# Patient Record
Sex: FEMALE | Race: WHITE | Employment: FULL TIME | URBAN - METROPOLITAN AREA
[De-identification: names, ages, dates, MRNs, and addresses within clinical notes are randomized per-mention and may not be internally consistent; named-entity substitution may affect disease eponyms.]

---

## 2018-11-16 ENCOUNTER — HOSPITAL ENCOUNTER (EMERGENCY)
Age: 33
Discharge: HOME | End: 2018-11-17
Attending: EMERGENCY MEDICINE
Payer: COMMERCIAL

## 2018-11-16 ENCOUNTER — APPOINTMENT (OUTPATIENT)
Dept: GENERAL RADIOLOGY | Age: 33
End: 2018-11-16
Payer: COMMERCIAL

## 2018-11-16 DIAGNOSIS — J18.9 PNEUMONIA DUE TO ORGANISM: Primary | ICD-10-CM

## 2018-11-16 LAB
A/G RATIO: 1.3 (ref 1.1–2.2)
ALBUMIN SERPL-MCNC: 4 G/DL (ref 3.4–5)
ALP BLD-CCNC: 69 U/L (ref 40–129)
ALT SERPL-CCNC: 19 U/L (ref 10–40)
ANION GAP SERPL CALCULATED.3IONS-SCNC: 12 MMOL/L (ref 3–16)
AST SERPL-CCNC: 16 U/L (ref 15–37)
BASOPHILS ABSOLUTE: 0 K/UL (ref 0–0.2)
BASOPHILS RELATIVE PERCENT: 0.5 %
BILIRUB SERPL-MCNC: <0.2 MG/DL (ref 0–1)
BUN BLDV-MCNC: 9 MG/DL (ref 7–20)
CALCIUM SERPL-MCNC: 9 MG/DL (ref 8.3–10.6)
CHLORIDE BLD-SCNC: 98 MMOL/L (ref 99–110)
CO2: 23 MMOL/L (ref 21–32)
CREAT SERPL-MCNC: 0.6 MG/DL (ref 0.6–1.1)
EOSINOPHILS ABSOLUTE: 0 K/UL (ref 0–0.6)
EOSINOPHILS RELATIVE PERCENT: 0.3 %
GFR AFRICAN AMERICAN: >60
GFR NON-AFRICAN AMERICAN: >60
GLOBULIN: 3 G/DL
GLUCOSE BLD-MCNC: 127 MG/DL (ref 70–99)
HCT VFR BLD CALC: 38 % (ref 36–48)
HEMOGLOBIN: 12.8 G/DL (ref 12–16)
LACTIC ACID: 1.6 MMOL/L (ref 0.4–2)
LYMPHOCYTES ABSOLUTE: 1.2 K/UL (ref 1–5.1)
LYMPHOCYTES RELATIVE PERCENT: 12.4 %
MCH RBC QN AUTO: 29.6 PG (ref 26–34)
MCHC RBC AUTO-ENTMCNC: 33.7 G/DL (ref 31–36)
MCV RBC AUTO: 87.9 FL (ref 80–100)
MONOCYTES ABSOLUTE: 0.4 K/UL (ref 0–1.3)
MONOCYTES RELATIVE PERCENT: 4.6 %
NEUTROPHILS ABSOLUTE: 7.7 K/UL (ref 1.7–7.7)
NEUTROPHILS RELATIVE PERCENT: 82.2 %
PDW BLD-RTO: 13.6 % (ref 12.4–15.4)
PLATELET # BLD: 271 K/UL (ref 135–450)
PMV BLD AUTO: 6.8 FL (ref 5–10.5)
POTASSIUM SERPL-SCNC: 3.6 MMOL/L (ref 3.5–5.1)
RAPID INFLUENZA  B AGN: NEGATIVE
RAPID INFLUENZA A AGN: NEGATIVE
RBC # BLD: 4.32 M/UL (ref 4–5.2)
SODIUM BLD-SCNC: 133 MMOL/L (ref 136–145)
TOTAL PROTEIN: 7 G/DL (ref 6.4–8.2)
WBC # BLD: 9.3 K/UL (ref 4–11)

## 2018-11-16 PROCEDURE — 85025 COMPLETE CBC W/AUTO DIFF WBC: CPT

## 2018-11-16 PROCEDURE — 6370000000 HC RX 637 (ALT 250 FOR IP): Performed by: PHYSICIAN ASSISTANT

## 2018-11-16 PROCEDURE — 87040 BLOOD CULTURE FOR BACTERIA: CPT

## 2018-11-16 PROCEDURE — 83605 ASSAY OF LACTIC ACID: CPT

## 2018-11-16 PROCEDURE — 80053 COMPREHEN METABOLIC PANEL: CPT

## 2018-11-16 PROCEDURE — 99285 EMERGENCY DEPT VISIT HI MDM: CPT

## 2018-11-16 PROCEDURE — 71046 X-RAY EXAM CHEST 2 VIEWS: CPT

## 2018-11-16 PROCEDURE — 94664 DEMO&/EVAL PT USE INHALER: CPT

## 2018-11-16 PROCEDURE — 96366 THER/PROPH/DIAG IV INF ADDON: CPT

## 2018-11-16 PROCEDURE — 2500000003 HC RX 250 WO HCPCS: Performed by: PHYSICIAN ASSISTANT

## 2018-11-16 PROCEDURE — 2580000003 HC RX 258: Performed by: PHYSICIAN ASSISTANT

## 2018-11-16 PROCEDURE — 96365 THER/PROPH/DIAG IV INF INIT: CPT

## 2018-11-16 PROCEDURE — 94640 AIRWAY INHALATION TREATMENT: CPT

## 2018-11-16 PROCEDURE — 87804 INFLUENZA ASSAY W/OPTIC: CPT

## 2018-11-16 RX ORDER — IPRATROPIUM BROMIDE AND ALBUTEROL SULFATE 2.5; .5 MG/3ML; MG/3ML
2 SOLUTION RESPIRATORY (INHALATION) ONCE
Status: COMPLETED | OUTPATIENT
Start: 2018-11-16 | End: 2018-11-16

## 2018-11-16 RX ORDER — PREDNISONE 20 MG/1
60 TABLET ORAL ONCE
Status: COMPLETED | OUTPATIENT
Start: 2018-11-16 | End: 2018-11-16

## 2018-11-16 RX ORDER — 0.9 % SODIUM CHLORIDE 0.9 %
1000 INTRAVENOUS SOLUTION INTRAVENOUS ONCE
Status: COMPLETED | OUTPATIENT
Start: 2018-11-16 | End: 2018-11-17

## 2018-11-16 RX ORDER — ACETAMINOPHEN 500 MG
1000 TABLET ORAL ONCE
Status: COMPLETED | OUTPATIENT
Start: 2018-11-16 | End: 2018-11-16

## 2018-11-16 RX ORDER — IPRATROPIUM BROMIDE AND ALBUTEROL SULFATE 2.5; .5 MG/3ML; MG/3ML
3 SOLUTION RESPIRATORY (INHALATION) ONCE
Status: DISCONTINUED | OUTPATIENT
Start: 2018-11-16 | End: 2018-11-16

## 2018-11-16 RX ORDER — IPRATROPIUM BROMIDE AND ALBUTEROL SULFATE 2.5; .5 MG/3ML; MG/3ML
1 SOLUTION RESPIRATORY (INHALATION) ONCE
Status: COMPLETED | OUTPATIENT
Start: 2018-11-16 | End: 2018-11-16

## 2018-11-16 RX ORDER — IBUPROFEN 400 MG/1
800 TABLET ORAL ONCE
Status: COMPLETED | OUTPATIENT
Start: 2018-11-16 | End: 2018-11-16

## 2018-11-16 RX ADMIN — PREDNISONE 60 MG: 20 TABLET ORAL at 21:16

## 2018-11-16 RX ADMIN — ACETAMINOPHEN 1000 MG: 500 TABLET ORAL at 21:16

## 2018-11-16 RX ADMIN — IPRATROPIUM BROMIDE AND ALBUTEROL SULFATE 1 AMPULE: .5; 3 SOLUTION RESPIRATORY (INHALATION) at 20:18

## 2018-11-16 RX ADMIN — SODIUM CHLORIDE 1000 ML: 9 INJECTION, SOLUTION INTRAVENOUS at 22:42

## 2018-11-16 RX ADMIN — IPRATROPIUM BROMIDE AND ALBUTEROL SULFATE 2 AMPULE: .5; 3 SOLUTION RESPIRATORY (INHALATION) at 22:39

## 2018-11-16 RX ADMIN — DOXYCYCLINE 100 MG: 100 INJECTION, POWDER, LYOPHILIZED, FOR SOLUTION INTRAVENOUS at 22:42

## 2018-11-16 RX ADMIN — IBUPROFEN 800 MG: 400 TABLET, FILM COATED ORAL at 21:16

## 2018-11-16 ASSESSMENT — ENCOUNTER SYMPTOMS
COUGH: 1
VOMITING: 0
RHINORRHEA: 0
NAUSEA: 0
SHORTNESS OF BREATH: 1
WHEEZING: 1
DIARRHEA: 0

## 2018-11-16 ASSESSMENT — PAIN SCALES - GENERAL
PAINLEVEL_OUTOF10: 2
PAINLEVEL_OUTOF10: 4
PAINLEVEL_OUTOF10: 6
PAINLEVEL_OUTOF10: 6

## 2018-11-16 ASSESSMENT — PAIN DESCRIPTION - PAIN TYPE: TYPE: ACUTE PAIN

## 2018-11-16 ASSESSMENT — PAIN DESCRIPTION - LOCATION: LOCATION: GENERALIZED

## 2018-11-16 ASSESSMENT — PAIN DESCRIPTION - DESCRIPTORS: DESCRIPTORS: ACHING

## 2018-11-17 VITALS
RESPIRATION RATE: 18 BRPM | TEMPERATURE: 100.2 F | DIASTOLIC BLOOD PRESSURE: 77 MMHG | OXYGEN SATURATION: 95 % | HEIGHT: 65 IN | HEART RATE: 110 BPM | SYSTOLIC BLOOD PRESSURE: 154 MMHG

## 2018-11-17 RX ORDER — ALBUTEROL SULFATE 90 UG/1
2 AEROSOL, METERED RESPIRATORY (INHALATION) EVERY 4 HOURS PRN
Qty: 1 INHALER | Refills: 1 | Status: ON HOLD | OUTPATIENT
Start: 2018-11-17 | End: 2020-02-11

## 2018-11-17 RX ORDER — METHYLPREDNISOLONE 4 MG/1
TABLET ORAL
Qty: 1 KIT | Refills: 0 | Status: ON HOLD | OUTPATIENT
Start: 2018-11-17 | End: 2022-01-14 | Stop reason: HOSPADM

## 2018-11-17 RX ORDER — BROMPHENIRAMINE MALEATE, PSEUDOEPHEDRINE HYDROCHLORIDE, AND DEXTROMETHORPHAN HYDROBROMIDE 2; 30; 10 MG/5ML; MG/5ML; MG/5ML
5 SYRUP ORAL 4 TIMES DAILY PRN
Qty: 150 ML | Refills: 0 | Status: ON HOLD | OUTPATIENT
Start: 2018-11-17 | End: 2022-01-13

## 2018-11-17 RX ORDER — DOXYCYCLINE 100 MG/1
100 TABLET ORAL 2 TIMES DAILY
Qty: 20 TABLET | Refills: 0 | Status: SHIPPED | OUTPATIENT
Start: 2018-11-17 | End: 2018-11-27

## 2018-11-17 NOTE — ED PROVIDER NOTES
history. No family status information on file. SOCIAL HISTORY      reports that she has never smoked. She does not have any smokeless tobacco history on file. She reports that she drinks about 0.6 - 1.2 oz of alcohol per week . She reports that she does not use drugs. PHYSICAL EXAM    (up to 7 for level 4, 8 or more for level 5)     ED Triage Vitals   BP Temp Temp src Pulse Resp SpO2 Height Weight   -- -- -- -- -- -- -- --       Physical Exam   Constitutional: She is oriented to person, place, and time. She appears well-developed and well-nourished. No distress. HENT:   Head: Normocephalic and atraumatic. Right Ear: External ear normal.   Left Ear: External ear normal.   Nose: Nose normal.   Mouth/Throat: No oropharyngeal exudate. Eyes: EOM are normal.   Neck: Normal range of motion. Neck supple. Cardiovascular: Regular rhythm and normal heart sounds. +Tachy   Pulmonary/Chest: Effort normal and breath sounds normal. No respiratory distress. She has no wheezes. Musculoskeletal: Normal range of motion. Neurological: She is alert and oriented to person, place, and time. Skin: Skin is warm and dry. She is not diaphoretic. Psychiatric: She has a normal mood and affect. Her behavior is normal. Judgment and thought content normal.       DIFFERENTIAL DIAGNOSIS   Pnuemonia, flu, URI, sepsis    DIAGNOSTICRESULTS         RADIOLOGY:   Non-plain film images such as CT, Ultrasound and MRI are read by the radiologist. Plain radiographic images are visualized and preliminarily interpreted by MELODY Bowling with the below findings:      Interpretation per the Radiologist below, if available at the time of this note:    XR CHEST STANDARD (2 VW)   Final Result   Confluent left lower lobe pneumonia. Follow-up to complete clearing is   recommended.                LABS:  Results for orders placed or performed during the hospital encounter of 11/16/18   Rapid influenza A/B antigens   Result Value

## 2018-11-22 LAB
BLOOD CULTURE, ROUTINE: NORMAL
CULTURE, BLOOD 2: NORMAL

## 2019-08-02 LAB
ABO, EXTERNAL RESULT: NORMAL
HEP B, EXTERNAL RESULT: NEGATIVE
HIV, EXTERNAL RESULT: NORMAL
RH FACTOR, EXTERNAL RESULT: POSITIVE
RPR, EXTERNAL RESULT: NORMAL
RUBELLA TITER, EXTERNAL RESULT: NORMAL

## 2020-01-16 LAB — GBS, EXTERNAL RESULT: NEGATIVE

## 2020-02-11 ENCOUNTER — HOSPITAL ENCOUNTER (INPATIENT)
Age: 35
LOS: 1 days | Discharge: HOME | End: 2020-02-12
Attending: OBSTETRICS & GYNECOLOGY | Admitting: OBSTETRICS & GYNECOLOGY
Payer: COMMERCIAL

## 2020-02-11 PROBLEM — Z34.00 SUPERVISION OF NORMAL FIRST PREGNANCY: Status: ACTIVE | Noted: 2020-02-11

## 2020-02-11 LAB
AMPHETAMINE SCREEN, URINE: NORMAL
BARBITURATE SCREEN URINE: NORMAL
BASOPHILS ABSOLUTE: 0 K/UL (ref 0–0.2)
BASOPHILS RELATIVE PERCENT: 0.4 %
BENZODIAZEPINE SCREEN, URINE: NORMAL
BUPRENORPHINE URINE: NORMAL
CANNABINOID SCREEN URINE: NORMAL
COCAINE METABOLITE SCREEN URINE: NORMAL
EOSINOPHILS ABSOLUTE: 0.1 K/UL (ref 0–0.6)
EOSINOPHILS RELATIVE PERCENT: 0.4 %
HCT VFR BLD CALC: 36.8 % (ref 36–48)
HEMOGLOBIN: 12.4 G/DL (ref 12–16)
LYMPHOCYTES ABSOLUTE: 2.1 K/UL (ref 1–5.1)
LYMPHOCYTES RELATIVE PERCENT: 15.9 %
Lab: NORMAL
MCH RBC QN AUTO: 30.1 PG (ref 26–34)
MCHC RBC AUTO-ENTMCNC: 33.7 G/DL (ref 31–36)
MCV RBC AUTO: 89.4 FL (ref 80–100)
METHADONE SCREEN, URINE: NORMAL
MONOCYTES ABSOLUTE: 0.6 K/UL (ref 0–1.3)
MONOCYTES RELATIVE PERCENT: 4.3 %
NEUTROPHILS ABSOLUTE: 10.6 K/UL (ref 1.7–7.7)
NEUTROPHILS RELATIVE PERCENT: 79 %
OPIATE SCREEN URINE: NORMAL
OXYCODONE URINE: NORMAL
PDW BLD-RTO: 14.8 % (ref 12.4–15.4)
PH UA: 5
PHENCYCLIDINE SCREEN URINE: NORMAL
PLATELET # BLD: 294 K/UL (ref 135–450)
PMV BLD AUTO: 7.3 FL (ref 5–10.5)
PROPOXYPHENE SCREEN: NORMAL
RBC # BLD: 4.12 M/UL (ref 4–5.2)
SPECIMEN STATUS: NORMAL
TOTAL SYPHILLIS IGG/IGM: NORMAL
WBC # BLD: 13.4 K/UL (ref 4–11)

## 2020-02-11 PROCEDURE — 6360000002 HC RX W HCPCS: Performed by: OBSTETRICS & GYNECOLOGY

## 2020-02-11 PROCEDURE — 7200000001 HC VAGINAL DELIVERY

## 2020-02-11 PROCEDURE — 0UQMXZZ REPAIR VULVA, EXTERNAL APPROACH: ICD-10-PCS | Performed by: OBSTETRICS & GYNECOLOGY

## 2020-02-11 PROCEDURE — 6370000000 HC RX 637 (ALT 250 FOR IP): Performed by: OBSTETRICS & GYNECOLOGY

## 2020-02-11 PROCEDURE — 6370000000 HC RX 637 (ALT 250 FOR IP)

## 2020-02-11 PROCEDURE — 86780 TREPONEMA PALLIDUM: CPT

## 2020-02-11 PROCEDURE — 2580000003 HC RX 258: Performed by: OBSTETRICS & GYNECOLOGY

## 2020-02-11 PROCEDURE — 1220000000 HC SEMI PRIVATE OB R&B

## 2020-02-11 PROCEDURE — 2500000003 HC RX 250 WO HCPCS: Performed by: OBSTETRICS & GYNECOLOGY

## 2020-02-11 PROCEDURE — 85025 COMPLETE CBC W/AUTO DIFF WBC: CPT

## 2020-02-11 PROCEDURE — 6360000002 HC RX W HCPCS

## 2020-02-11 PROCEDURE — 80307 DRUG TEST PRSMV CHEM ANLYZR: CPT

## 2020-02-11 PROCEDURE — 0KQM0ZZ REPAIR PERINEUM MUSCLE, OPEN APPROACH: ICD-10-PCS | Performed by: OBSTETRICS & GYNECOLOGY

## 2020-02-11 RX ORDER — LIDOCAINE HYDROCHLORIDE 10 MG/ML
INJECTION, SOLUTION EPIDURAL; INFILTRATION; INTRACAUDAL; PERINEURAL
Status: DISCONTINUED
Start: 2020-02-11 | End: 2020-02-11

## 2020-02-11 RX ORDER — SODIUM CHLORIDE 0.9 % (FLUSH) 0.9 %
10 SYRINGE (ML) INJECTION EVERY 12 HOURS SCHEDULED
Status: DISCONTINUED | OUTPATIENT
Start: 2020-02-11 | End: 2020-02-11

## 2020-02-11 RX ORDER — ACETAMINOPHEN 325 MG/1
650 TABLET ORAL EVERY 4 HOURS PRN
Status: DISCONTINUED | OUTPATIENT
Start: 2020-02-11 | End: 2020-02-12 | Stop reason: HOSPADM

## 2020-02-11 RX ORDER — SODIUM CHLORIDE, SODIUM LACTATE, POTASSIUM CHLORIDE, CALCIUM CHLORIDE 600; 310; 30; 20 MG/100ML; MG/100ML; MG/100ML; MG/100ML
INJECTION, SOLUTION INTRAVENOUS CONTINUOUS
Status: DISCONTINUED | OUTPATIENT
Start: 2020-02-11 | End: 2020-02-11

## 2020-02-11 RX ORDER — ACETAMINOPHEN 325 MG/1
650 TABLET ORAL EVERY 4 HOURS PRN
Status: DISCONTINUED | OUTPATIENT
Start: 2020-02-11 | End: 2020-02-11

## 2020-02-11 RX ORDER — NIFEDIPINE 10 MG/1
10 CAPSULE ORAL EVERY 8 HOURS SCHEDULED
Status: DISCONTINUED | OUTPATIENT
Start: 2020-02-11 | End: 2020-02-12 | Stop reason: HOSPADM

## 2020-02-11 RX ORDER — ONDANSETRON 2 MG/ML
4 INJECTION INTRAMUSCULAR; INTRAVENOUS EVERY 6 HOURS PRN
Status: DISCONTINUED | OUTPATIENT
Start: 2020-02-11 | End: 2020-02-11

## 2020-02-11 RX ORDER — METHYLERGONOVINE MALEATE 0.2 MG/ML
200 INJECTION INTRAVENOUS PRN
Status: DISCONTINUED | OUTPATIENT
Start: 2020-02-11 | End: 2020-02-11

## 2020-02-11 RX ORDER — SODIUM CHLORIDE, SODIUM LACTATE, POTASSIUM CHLORIDE, CALCIUM CHLORIDE 600; 310; 30; 20 MG/100ML; MG/100ML; MG/100ML; MG/100ML
INJECTION, SOLUTION INTRAVENOUS CONTINUOUS
Status: DISCONTINUED | OUTPATIENT
Start: 2020-02-11 | End: 2020-02-12 | Stop reason: HOSPADM

## 2020-02-11 RX ORDER — LANOLIN 100 %
OINTMENT (GRAM) TOPICAL PRN
Status: DISCONTINUED | OUTPATIENT
Start: 2020-02-11 | End: 2020-02-12 | Stop reason: HOSPADM

## 2020-02-11 RX ORDER — SODIUM CHLORIDE 0.9 % (FLUSH) 0.9 %
10 SYRINGE (ML) INJECTION PRN
Status: DISCONTINUED | OUTPATIENT
Start: 2020-02-11 | End: 2020-02-12 | Stop reason: HOSPADM

## 2020-02-11 RX ORDER — DOCUSATE SODIUM 100 MG/1
100 CAPSULE, LIQUID FILLED ORAL 2 TIMES DAILY
Status: DISCONTINUED | OUTPATIENT
Start: 2020-02-11 | End: 2020-02-11

## 2020-02-11 RX ORDER — METHYLERGONOVINE MALEATE 0.2 MG/ML
200 INJECTION INTRAVENOUS PRN
Status: DISCONTINUED | OUTPATIENT
Start: 2020-02-11 | End: 2020-02-12 | Stop reason: HOSPADM

## 2020-02-11 RX ORDER — DOCUSATE SODIUM 100 MG/1
100 CAPSULE, LIQUID FILLED ORAL 2 TIMES DAILY
Status: DISCONTINUED | OUTPATIENT
Start: 2020-02-11 | End: 2020-02-12 | Stop reason: HOSPADM

## 2020-02-11 RX ORDER — IBUPROFEN 600 MG/1
600 TABLET ORAL EVERY 8 HOURS PRN
Status: DISCONTINUED | OUTPATIENT
Start: 2020-02-11 | End: 2020-02-12 | Stop reason: HOSPADM

## 2020-02-11 RX ORDER — MISOPROSTOL 100 UG/1
800 TABLET ORAL PRN
Status: DISCONTINUED | OUTPATIENT
Start: 2020-02-11 | End: 2020-02-11

## 2020-02-11 RX ORDER — LABETALOL HYDROCHLORIDE 5 MG/ML
20 INJECTION, SOLUTION INTRAVENOUS ONCE
Status: COMPLETED | OUTPATIENT
Start: 2020-02-11 | End: 2020-02-11

## 2020-02-11 RX ORDER — ERYTHROMYCIN 5 MG/G
OINTMENT OPHTHALMIC
Status: DISCONTINUED
Start: 2020-02-11 | End: 2020-02-11

## 2020-02-11 RX ORDER — SODIUM CHLORIDE 0.9 % (FLUSH) 0.9 %
10 SYRINGE (ML) INJECTION EVERY 12 HOURS SCHEDULED
Status: DISCONTINUED | OUTPATIENT
Start: 2020-02-11 | End: 2020-02-12 | Stop reason: HOSPADM

## 2020-02-11 RX ORDER — SODIUM CHLORIDE 0.9 % (FLUSH) 0.9 %
10 SYRINGE (ML) INJECTION PRN
Status: DISCONTINUED | OUTPATIENT
Start: 2020-02-11 | End: 2020-02-11

## 2020-02-11 RX ADMIN — Medication 999 MILLI-UNITS/MIN: at 02:42

## 2020-02-11 RX ADMIN — DOCUSATE SODIUM 100 MG: 100 CAPSULE ORAL at 09:42

## 2020-02-11 RX ADMIN — IBUPROFEN 600 MG: 600 TABLET, FILM COATED ORAL at 18:08

## 2020-02-11 RX ADMIN — SODIUM CHLORIDE, POTASSIUM CHLORIDE, SODIUM LACTATE AND CALCIUM CHLORIDE: 600; 310; 30; 20 INJECTION, SOLUTION INTRAVENOUS at 02:05

## 2020-02-11 RX ADMIN — DOCUSATE SODIUM 100 MG: 100 CAPSULE ORAL at 20:14

## 2020-02-11 RX ADMIN — Medication 10 ML: at 09:42

## 2020-02-11 RX ADMIN — BENZOCAINE AND LEVOMENTHOL: 200; 5 SPRAY TOPICAL at 05:02

## 2020-02-11 RX ADMIN — LABETALOL HYDROCHLORIDE 20 MG: 5 INJECTION, SOLUTION INTRAVENOUS at 05:46

## 2020-02-11 RX ADMIN — NIFEDIPINE 10 MG: 10 CAPSULE ORAL at 20:14

## 2020-02-11 RX ADMIN — Medication 200 MILLI-UNITS/MIN: at 03:50

## 2020-02-11 ASSESSMENT — PAIN SCALES - GENERAL: PAINLEVEL_OUTOF10: 1

## 2020-02-11 NOTE — H&P
Obstetrics and Gynecology   Obstetrics History and Physical        CHIEF COMPLAINT:  contractions    HISTORY OF PRESENT ILLNESS:      The patient is a 29 y.o. female at 44w3d. OB History        1    Para        Term                AB        Living   0       SAB        TAB        Ectopic        Molar        Multiple        Live Births                Patient presents with a chief complaint as above and is being admitted for active phase labor. The patient does express a strong desire for natural childbirth and she is consented for this choice at each office visit. Estimated Due Date: Estimated Date of Delivery: 2/10/20    PRENATAL CARE:    Complicated by: none    PAST OB HISTORY:  OB History        1    Para        Term                AB        Living   0       SAB        TAB        Ectopic        Molar        Multiple        Live Births                    Past Medical History:        Diagnosis Date    Asthma     USES INHALER     Past Surgical History:    History reviewed. No pertinent surgical history. Allergies:  Patient has no known allergies. Social History:    Social History     Socioeconomic History    Marital status:      Spouse name: Not on file    Number of children: Not on file    Years of education: Not on file    Highest education level: Not on file   Occupational History    Not on file   Social Needs    Financial resource strain: Not on file    Food insecurity:     Worry: Not on file     Inability: Not on file    Transportation needs:     Medical: Not on file     Non-medical: Not on file   Tobacco Use    Smoking status: Never Smoker    Smokeless tobacco: Never Used   Substance and Sexual Activity    Alcohol use:  Yes     Alcohol/week: 1.0 - 2.0 standard drinks     Types: 1 - 2 Standard drinks or equivalent per week    Drug use: No    Sexual activity: Yes     Partners: Male   Lifestyle    Physical activity:     Days per week: Not on file     Minutes

## 2020-02-11 NOTE — LACTATION NOTE
This note was copied from a baby's chart. Lactation Progress Note      Data:  LC to room per RN request.   MOB stated infant had not fed since 10. Infant was in crib asleep and swaddled. Action:  [facility] asked permission from MOB to undress infant  down to the diaper to help infant wake for feeding. MOB gave [facility] permission to undress infant. Infant was given to MOB and infant started to spit up. MOB attempted to burp infant. Infant was then placed in cradle hold and MOB attempted to feed infant on the right breast.  LC then repositioned infant in football hold. Infant would latch for 2 sucks then would come off. Infant attempt for 10 min on right breast and would not stay awake for feed. MOB hand expressed drops of colostrum into infants mouth. Infant was placed skin to skin with MOB and infant began rooting after 5 min. MOB attempted infant in cradle hold on the left breast and infant could not obtain a latch. LC explained to MOB on how to do cross cradle hold to have more control over infants head during feeding. Infant still could not obtain a latch. LC placed infant in football hold and demonstrated to MOB how to do the corner hold of the nipple to help infant obtain a latch. Infant latched on and fed for 15 min with SRS and several swallow. [facility] burped infant and placed infant skin to skin with MOB. Discussed ways to know if infant is latched and that MOB should not feed pain during feeding. MOB stated she has a [employer] pump for home use. Response:  MOB will call for help with next feeding if experiencing problems latching or if MOB has more questions for LC. No further questions at this time.

## 2020-02-11 NOTE — FLOWSHEET NOTE
[id number]    Pt presented to triage w c/o SROM @ 2100 and contractions. SVE 8/90/0. Pt moved to OB room 10. Dr Cristi Zavaleta notified and en route to hospital.  EFM hand held per this RN, audible movements heard. Difficult to monitor due to maternal habitus and pt unable to sit still due to pain. Pt desires NCB. This RN remained @ bedside until delivery. Dr Cristi Zavaleta @ bedside @ 9911 0053. SVE 10/100/+2.  viable female infant. Apgars 9/9.

## 2020-02-11 NOTE — LACTATION NOTE
This note was copied from a baby's chart. Lactation Consult Note      LC to room per RN request to assist with feeding. R/L nipple is WNL/everted; tissue is very stretchy. Colostrum expressed easily per mom, NB latched easily at L breast in cross cradle hold. MOB used c-shape hold for initial latch; MARIAMA, SRS, and AS. Mother denies any pain with latch. Reviewed plans to know if baby is getting enough at the breast. Will provide Understanding Breastfeeding Information book upon admission to [facility] unit.

## 2020-02-11 NOTE — L&D DELIVERY NOTE
intervention:  reduced  Nuchal cord description:  loose nuchal cord  Cord around:  shoulders  Number of loops:  1  Delayed cord clamping?:  Yes  Cord clamped date/time:  2020 0240  Cord blood disposition:  Refrigerator  Gases sent?:  Yes  Stem cell collection (by provider): No     Placenta    Date/time:  2020 02:42:00  Removal:  Spontaneous  Appearance:  Intact  Disposition:  Refrigerator     Delivery Resuscitation    Method:  Bulb Suction, Stimulation     Apgars    Living status:  Living  Apgars   1 Minute:   5 Minute:   10 Minute 15 Minute 20 Minute   Skin Color: 1  1       Heart Rate: 2  2       Reflex Irritability: 2  2       Muscle Tone: 2  2       Respiratory Effort: 2  2       Total: 9  9               Apgars Assigned By:  Kentrell Wilson RN     Skin to Skin    Skin to skin initiation date/time: 20 02:39:00   Skin to skin with: Mother  Skin to skin end date/time:        [place] Measurements           Delivery Information    Episiotomy:  None  Perineal lacerations:  2nd Repaired:  Yes   Surgical or additional est. blood loss (mL):  0 (View Only):  Edit in Flowsheets   Combined est. blood loss (mL):  0     Vaginal Delivery Counts    Initial count personnel:  FLACA GALEANA OB TECH  Initial count verified by:  FLACA KAYE RN   4x4:   Needles:   Instruments:   Lap Pads:   Sponges:     Initial counts:          Final counts:                Spontaneous vaginal delivery over second laceration with irregular nature as a cicatrix over the posterior fourchette with repair under local anesthesia with 3-0 vicryl in the usual fashion without difficulty. A inner right labial laceration is hemostatic and not repaired. Mom and baby are doing well at this time.

## 2020-02-12 VITALS
SYSTOLIC BLOOD PRESSURE: 143 MMHG | RESPIRATION RATE: 18 BRPM | WEIGHT: 234 LBS | BODY MASS INDEX: 38.94 KG/M2 | TEMPERATURE: 98.4 F | HEART RATE: 99 BPM | DIASTOLIC BLOOD PRESSURE: 92 MMHG

## 2020-02-12 PROBLEM — Z34.00 SUPERVISION OF NORMAL FIRST PREGNANCY: Status: RESOLVED | Noted: 2020-02-11 | Resolved: 2020-02-12

## 2020-02-12 PROCEDURE — 6370000000 HC RX 637 (ALT 250 FOR IP): Performed by: OBSTETRICS & GYNECOLOGY

## 2020-02-12 RX ORDER — NIFEDIPINE 10 MG/1
10 CAPSULE ORAL ONCE
Status: COMPLETED | OUTPATIENT
Start: 2020-02-12 | End: 2020-02-12

## 2020-02-12 RX ORDER — NIFEDIPINE 60 MG/1
60 TABLET, EXTENDED RELEASE ORAL DAILY
Qty: 30 TABLET | Refills: 3 | Status: ON HOLD | OUTPATIENT
Start: 2020-02-12 | End: 2022-01-13

## 2020-02-12 RX ADMIN — NIFEDIPINE 10 MG: 10 CAPSULE ORAL at 06:10

## 2020-02-12 RX ADMIN — DOCUSATE SODIUM 100 MG: 100 CAPSULE ORAL at 08:46

## 2020-02-12 RX ADMIN — NIFEDIPINE 10 MG: 10 CAPSULE ORAL at 14:06

## 2020-02-12 RX ADMIN — ACETAMINOPHEN 650 MG: 325 TABLET, FILM COATED ORAL at 17:19

## 2020-02-12 RX ADMIN — ACETAMINOPHEN 650 MG: 325 TABLET, FILM COATED ORAL at 08:46

## 2020-02-12 RX ADMIN — NIFEDIPINE 10 MG: 10 CAPSULE ORAL at 17:16

## 2020-02-12 ASSESSMENT — PAIN SCALES - GENERAL
PAINLEVEL_OUTOF10: 1
PAINLEVEL_OUTOF10: 1

## 2020-02-12 NOTE — FLOWSHEET NOTE
Dr. Saul Martin aware of current elevated BPs. Dr. Saul Martin stated for pt to take the Procardia that is ordered while here, she may go home after dinner and start the procardia XL at home. May notify MD and hold discharge if BP is greater than 160/100. RN will continue to monitor.

## 2020-02-12 NOTE — FLOWSHEET NOTE
Notified Dr. Hilario Jalloh of pt's BP still being elevated. BP at 1400 was 148/107 and Procardia 10mg was given at that time. At 1547 pt's BP was 156/92. Next dose is at 2200 and this pt is wanting to be discharged. New order to given last dose of 10mg Procardia early and before she leaves and can start Procardia XL tomorrow morning. Pt aware of POC and VU. Spoke with pharmacy to make sure order is in correct and was verified. RN will continue to monitor.

## 2020-02-12 NOTE — FLOWSHEET NOTE
Left message for Dr. Hilario Jalloh regarding pt's elevated BP. Awaiting call back. RN will continue to monitor.

## 2020-02-13 NOTE — FLOWSHEET NOTE
Postpartum and infant care teaching completed and forms signed by patient. Copy witnessed by RN and given to patient. Patient verbalized understanding of all teaching points. Patient has procardia XL prescription. Patient plans to follow-up with OB Provider in 2 weeks for a BP check as instructed. Patient verbalizes understanding of discharge instructions and verbalizes hypertension discharge instructions and denies further questions. ID bands checked. Mother's ID band and one of baby's ID bands removed and taped to footprint sheet, signed by patient and witnessed by RN. Patient discharged in stable condition accompanied by family. Discharged in wheelchair, holding baby in arms.

## 2022-01-13 ENCOUNTER — HOSPITAL ENCOUNTER (INPATIENT)
Age: 37
LOS: 2 days | Discharge: HOME | End: 2022-01-15
Attending: OBSTETRICS & GYNECOLOGY | Admitting: OBSTETRICS & GYNECOLOGY
Payer: COMMERCIAL

## 2022-01-13 PROBLEM — O16.9 HYPERTENSION DURING PREGNANCY, ANTEPARTUM: Status: ACTIVE | Noted: 2022-01-13

## 2022-01-13 PROBLEM — Z37.9 NORMAL LABOR: Status: ACTIVE | Noted: 2022-01-13

## 2022-01-13 LAB
A/G RATIO: 1.1 (ref 1.1–2.2)
ALBUMIN SERPL-MCNC: 3.5 G/DL (ref 3.4–5)
ALP BLD-CCNC: 106 U/L (ref 40–129)
ALT SERPL-CCNC: 9 U/L (ref 10–40)
AMPHETAMINE SCREEN, URINE: NORMAL
ANION GAP SERPL CALCULATED.3IONS-SCNC: 13 MMOL/L (ref 3–16)
APTT: 29.3 SEC (ref 26.2–38.6)
AST SERPL-CCNC: 13 U/L (ref 15–37)
BACTERIA: ABNORMAL /HPF
BARBITURATE SCREEN URINE: NORMAL
BASOPHILS ABSOLUTE: 0 K/UL (ref 0–0.2)
BASOPHILS RELATIVE PERCENT: 0.2 %
BENZODIAZEPINE SCREEN, URINE: NORMAL
BILIRUB SERPL-MCNC: 0.5 MG/DL (ref 0–1)
BILIRUBIN URINE: NEGATIVE
BLOOD, URINE: ABNORMAL
BUN BLDV-MCNC: 8 MG/DL (ref 7–20)
BUPRENORPHINE URINE: NORMAL
CALCIUM SERPL-MCNC: 9.9 MG/DL (ref 8.3–10.6)
CANNABINOID SCREEN URINE: NORMAL
CHLORIDE BLD-SCNC: 100 MMOL/L (ref 99–110)
CLARITY: ABNORMAL
CO2: 19 MMOL/L (ref 21–32)
COCAINE METABOLITE SCREEN URINE: NORMAL
COLOR: YELLOW
CREAT SERPL-MCNC: <0.5 MG/DL (ref 0.6–1.1)
EOSINOPHILS ABSOLUTE: 0.1 K/UL (ref 0–0.6)
EOSINOPHILS RELATIVE PERCENT: 0.7 %
EPITHELIAL CELLS, UA: 5 /HPF (ref 0–5)
FIBRINOGEN: 568 MG/DL (ref 200–397)
GFR AFRICAN AMERICAN: >60
GFR NON-AFRICAN AMERICAN: >60
GLUCOSE BLD-MCNC: 81 MG/DL (ref 70–99)
GLUCOSE URINE: NEGATIVE MG/DL
HCT VFR BLD CALC: 36.2 % (ref 36–48)
HEMOGLOBIN: 12.2 G/DL (ref 12–16)
HYALINE CASTS: 3 /LPF (ref 0–8)
INR BLD: 0.9 (ref 0.88–1.12)
KETONES, URINE: NEGATIVE MG/DL
LEUKOCYTE ESTERASE, URINE: ABNORMAL
LYMPHOCYTES ABSOLUTE: 2.6 K/UL (ref 1–5.1)
LYMPHOCYTES RELATIVE PERCENT: 22.6 %
Lab: NORMAL
MCH RBC QN AUTO: 30.1 PG (ref 26–34)
MCHC RBC AUTO-ENTMCNC: 33.8 G/DL (ref 31–36)
MCV RBC AUTO: 89 FL (ref 80–100)
METHADONE SCREEN, URINE: NORMAL
MICROSCOPIC EXAMINATION: YES
MONOCYTES ABSOLUTE: 0.7 K/UL (ref 0–1.3)
MONOCYTES RELATIVE PERCENT: 6.4 %
NEUTROPHILS ABSOLUTE: 8 K/UL (ref 1.7–7.7)
NEUTROPHILS RELATIVE PERCENT: 70.1 %
NITRITE, URINE: NEGATIVE
OPIATE SCREEN URINE: NORMAL
OXYCODONE URINE: NORMAL
PDW BLD-RTO: 14.4 % (ref 12.4–15.4)
PH UA: 6.5
PH UA: 6.5 (ref 5–8)
PHENCYCLIDINE SCREEN URINE: NORMAL
PLATELET # BLD: 282 K/UL (ref 135–450)
PMV BLD AUTO: 7.9 FL (ref 5–10.5)
POTASSIUM SERPL-SCNC: 4.1 MMOL/L (ref 3.5–5.1)
PROPOXYPHENE SCREEN: NORMAL
PROTEIN UA: NEGATIVE MG/DL
PROTHROMBIN TIME: 10.1 SEC (ref 9.9–12.7)
RBC # BLD: 4.06 M/UL (ref 4–5.2)
RBC UA: 2 /HPF (ref 0–4)
SARS-COV-2, NAAT: NOT DETECTED
SODIUM BLD-SCNC: 132 MMOL/L (ref 136–145)
SPECIFIC GRAVITY UA: <1.005 (ref 1–1.03)
TOTAL PROTEIN: 6.7 G/DL (ref 6.4–8.2)
URIC ACID, SERUM: 4.3 MG/DL (ref 2.6–6)
URINE TYPE: ABNORMAL
UROBILINOGEN, URINE: 0.2 E.U./DL
WBC # BLD: 11.4 K/UL (ref 4–11)
WBC UA: 15 /HPF (ref 0–5)

## 2022-01-13 PROCEDURE — 80307 DRUG TEST PRSMV CHEM ANLYZR: CPT

## 2022-01-13 PROCEDURE — 86850 RBC ANTIBODY SCREEN: CPT

## 2022-01-13 PROCEDURE — 1220000000 HC SEMI PRIVATE OB R&B

## 2022-01-13 PROCEDURE — 2500000003 HC RX 250 WO HCPCS: Performed by: OBSTETRICS & GYNECOLOGY

## 2022-01-13 PROCEDURE — 85610 PROTHROMBIN TIME: CPT

## 2022-01-13 PROCEDURE — 87635 SARS-COV-2 COVID-19 AMP PRB: CPT

## 2022-01-13 PROCEDURE — 85384 FIBRINOGEN ACTIVITY: CPT

## 2022-01-13 PROCEDURE — 85025 COMPLETE CBC W/AUTO DIFF WBC: CPT

## 2022-01-13 PROCEDURE — 6370000000 HC RX 637 (ALT 250 FOR IP): Performed by: OBSTETRICS & GYNECOLOGY

## 2022-01-13 PROCEDURE — 80053 COMPREHEN METABOLIC PANEL: CPT

## 2022-01-13 PROCEDURE — 86901 BLOOD TYPING SEROLOGIC RH(D): CPT

## 2022-01-13 PROCEDURE — 81001 URINALYSIS AUTO W/SCOPE: CPT

## 2022-01-13 PROCEDURE — 86900 BLOOD TYPING SEROLOGIC ABO: CPT

## 2022-01-13 PROCEDURE — 84550 ASSAY OF BLOOD/URIC ACID: CPT

## 2022-01-13 PROCEDURE — 86780 TREPONEMA PALLIDUM: CPT

## 2022-01-13 PROCEDURE — 85730 THROMBOPLASTIN TIME PARTIAL: CPT

## 2022-01-13 RX ORDER — LABETALOL HYDROCHLORIDE 5 MG/ML
40 INJECTION, SOLUTION INTRAVENOUS ONCE
Status: COMPLETED | OUTPATIENT
Start: 2022-01-14 | End: 2022-01-13

## 2022-01-13 RX ORDER — LABETALOL 200 MG/1
100 TABLET, FILM COATED ORAL ONCE
Status: COMPLETED | OUTPATIENT
Start: 2022-01-13 | End: 2022-01-13

## 2022-01-13 RX ORDER — SODIUM CHLORIDE, SODIUM LACTATE, POTASSIUM CHLORIDE, AND CALCIUM CHLORIDE .6; .31; .03; .02 G/100ML; G/100ML; G/100ML; G/100ML
500 INJECTION, SOLUTION INTRAVENOUS PRN
Status: DISCONTINUED | OUTPATIENT
Start: 2022-01-13 | End: 2022-01-14

## 2022-01-13 RX ORDER — SODIUM CHLORIDE 0.9 % (FLUSH) 0.9 %
5-40 SYRINGE (ML) INJECTION EVERY 12 HOURS SCHEDULED
Status: DISCONTINUED | OUTPATIENT
Start: 2022-01-13 | End: 2022-01-14

## 2022-01-13 RX ORDER — ACETAMINOPHEN 325 MG/1
650 TABLET ORAL EVERY 4 HOURS PRN
Status: DISCONTINUED | OUTPATIENT
Start: 2022-01-13 | End: 2022-01-14

## 2022-01-13 RX ORDER — ONDANSETRON 2 MG/ML
4 INJECTION INTRAMUSCULAR; INTRAVENOUS EVERY 6 HOURS PRN
Status: DISCONTINUED | OUTPATIENT
Start: 2022-01-13 | End: 2022-01-14

## 2022-01-13 RX ORDER — SODIUM CHLORIDE, SODIUM LACTATE, POTASSIUM CHLORIDE, AND CALCIUM CHLORIDE .6; .31; .03; .02 G/100ML; G/100ML; G/100ML; G/100ML
1000 INJECTION, SOLUTION INTRAVENOUS PRN
Status: DISCONTINUED | OUTPATIENT
Start: 2022-01-13 | End: 2022-01-14

## 2022-01-13 RX ORDER — LIDOCAINE HYDROCHLORIDE 10 MG/ML
INJECTION, SOLUTION EPIDURAL; INFILTRATION; INTRACAUDAL; PERINEURAL
Status: COMPLETED
Start: 2022-01-13 | End: 2022-01-14

## 2022-01-13 RX ORDER — SODIUM CHLORIDE 0.9 % (FLUSH) 0.9 %
5-40 SYRINGE (ML) INJECTION PRN
Status: DISCONTINUED | OUTPATIENT
Start: 2022-01-13 | End: 2022-01-14

## 2022-01-13 RX ORDER — LABETALOL HYDROCHLORIDE 5 MG/ML
20 INJECTION, SOLUTION INTRAVENOUS ONCE
Status: COMPLETED | OUTPATIENT
Start: 2022-01-13 | End: 2022-01-13

## 2022-01-13 RX ORDER — SODIUM CHLORIDE, SODIUM LACTATE, POTASSIUM CHLORIDE, CALCIUM CHLORIDE 600; 310; 30; 20 MG/100ML; MG/100ML; MG/100ML; MG/100ML
INJECTION, SOLUTION INTRAVENOUS CONTINUOUS PRN
Status: DISCONTINUED | OUTPATIENT
Start: 2022-01-13 | End: 2022-01-14

## 2022-01-13 RX ORDER — SODIUM CHLORIDE 9 MG/ML
25 INJECTION, SOLUTION INTRAVENOUS PRN
Status: DISCONTINUED | OUTPATIENT
Start: 2022-01-13 | End: 2022-01-14

## 2022-01-13 RX ORDER — ERYTHROMYCIN 5 MG/G
OINTMENT OPHTHALMIC
Status: DISCONTINUED
Start: 2022-01-13 | End: 2022-01-14

## 2022-01-13 RX ORDER — DOCUSATE SODIUM 100 MG/1
100 CAPSULE, LIQUID FILLED ORAL 2 TIMES DAILY
Status: DISCONTINUED | OUTPATIENT
Start: 2022-01-13 | End: 2022-01-14

## 2022-01-13 RX ORDER — LABETALOL HYDROCHLORIDE 5 MG/ML
80 INJECTION, SOLUTION INTRAVENOUS ONCE
Status: COMPLETED | OUTPATIENT
Start: 2022-01-14 | End: 2022-01-13

## 2022-01-13 RX ORDER — LABETALOL HYDROCHLORIDE 5 MG/ML
INJECTION, SOLUTION INTRAVENOUS
Status: DISCONTINUED
Start: 2022-01-13 | End: 2022-01-14

## 2022-01-13 RX ADMIN — LABETALOL HYDROCHLORIDE 80 MG: 5 INJECTION INTRAVENOUS at 23:45

## 2022-01-13 RX ADMIN — LABETALOL HYDROCHLORIDE 40 MG: 5 INJECTION INTRAVENOUS at 23:30

## 2022-01-13 RX ADMIN — LABETALOL HYDROCHLORIDE 100 MG: 200 TABLET, FILM COATED ORAL at 22:10

## 2022-01-13 RX ADMIN — LABETALOL HYDROCHLORIDE 20 MG: 5 INJECTION INTRAVENOUS at 23:15

## 2022-01-14 PROBLEM — Z37.9 NORMAL LABOR: Status: RESOLVED | Noted: 2022-01-13 | Resolved: 2022-01-14

## 2022-01-14 PROBLEM — Z37.9 NORMAL LABOR: Status: ACTIVE | Noted: 2022-01-14

## 2022-01-14 PROBLEM — Z37.9 NORMAL LABOR: Status: RESOLVED | Noted: 2022-01-14 | Resolved: 2022-01-14

## 2022-01-14 LAB
ABO/RH: NORMAL
ANTIBODY SCREEN: NORMAL
TOTAL SYPHILLIS IGG/IGM: NORMAL

## 2022-01-14 PROCEDURE — 6360000002 HC RX W HCPCS

## 2022-01-14 PROCEDURE — 6370000000 HC RX 637 (ALT 250 FOR IP): Performed by: OBSTETRICS & GYNECOLOGY

## 2022-01-14 PROCEDURE — 6360000002 HC RX W HCPCS: Performed by: OBSTETRICS & GYNECOLOGY

## 2022-01-14 PROCEDURE — 1220000000 HC SEMI PRIVATE OB R&B

## 2022-01-14 PROCEDURE — 7200000001 HC VAGINAL DELIVERY

## 2022-01-14 PROCEDURE — 2580000003 HC RX 258: Performed by: OBSTETRICS & GYNECOLOGY

## 2022-01-14 PROCEDURE — 0KQM0ZZ REPAIR PERINEUM MUSCLE, OPEN APPROACH: ICD-10-PCS | Performed by: OBSTETRICS & GYNECOLOGY

## 2022-01-14 PROCEDURE — 2500000003 HC RX 250 WO HCPCS

## 2022-01-14 RX ORDER — ACETAMINOPHEN 325 MG/1
650 TABLET ORAL EVERY 4 HOURS PRN
Status: DISCONTINUED | OUTPATIENT
Start: 2022-01-14 | End: 2022-01-15 | Stop reason: HOSPADM

## 2022-01-14 RX ORDER — SODIUM CHLORIDE, SODIUM LACTATE, POTASSIUM CHLORIDE, CALCIUM CHLORIDE 600; 310; 30; 20 MG/100ML; MG/100ML; MG/100ML; MG/100ML
INJECTION, SOLUTION INTRAVENOUS CONTINUOUS
Status: DISCONTINUED | OUTPATIENT
Start: 2022-01-14 | End: 2022-01-15 | Stop reason: HOSPADM

## 2022-01-14 RX ORDER — LABETALOL 200 MG/1
200 TABLET, FILM COATED ORAL EVERY 12 HOURS SCHEDULED
Status: DISCONTINUED | OUTPATIENT
Start: 2022-01-14 | End: 2022-01-15 | Stop reason: HOSPADM

## 2022-01-14 RX ORDER — SODIUM CHLORIDE 9 MG/ML
25 INJECTION, SOLUTION INTRAVENOUS PRN
Status: DISCONTINUED | OUTPATIENT
Start: 2022-01-14 | End: 2022-01-15 | Stop reason: HOSPADM

## 2022-01-14 RX ORDER — DOCUSATE SODIUM 100 MG/1
100 CAPSULE, LIQUID FILLED ORAL 2 TIMES DAILY
Status: DISCONTINUED | OUTPATIENT
Start: 2022-01-14 | End: 2022-01-15 | Stop reason: HOSPADM

## 2022-01-14 RX ORDER — IBUPROFEN 800 MG/1
800 TABLET ORAL EVERY 8 HOURS PRN
Status: DISCONTINUED | OUTPATIENT
Start: 2022-01-14 | End: 2022-01-15 | Stop reason: HOSPADM

## 2022-01-14 RX ORDER — MODIFIED LANOLIN
OINTMENT (GRAM) TOPICAL PRN
Status: DISCONTINUED | OUTPATIENT
Start: 2022-01-14 | End: 2022-01-15 | Stop reason: HOSPADM

## 2022-01-14 RX ORDER — LABETALOL 200 MG/1
200 TABLET, FILM COATED ORAL EVERY 12 HOURS SCHEDULED
Qty: 60 TABLET | Refills: 0 | Status: SHIPPED | OUTPATIENT
Start: 2022-01-14 | End: 2022-02-13

## 2022-01-14 RX ORDER — SODIUM CHLORIDE 0.9 % (FLUSH) 0.9 %
10 SYRINGE (ML) INJECTION PRN
Status: DISCONTINUED | OUTPATIENT
Start: 2022-01-14 | End: 2022-01-15 | Stop reason: HOSPADM

## 2022-01-14 RX ORDER — HYDRALAZINE HYDROCHLORIDE 20 MG/ML
10 INJECTION INTRAMUSCULAR; INTRAVENOUS ONCE
Status: COMPLETED | OUTPATIENT
Start: 2022-01-14 | End: 2022-01-14

## 2022-01-14 RX ORDER — SODIUM CHLORIDE 0.9 % (FLUSH) 0.9 %
10 SYRINGE (ML) INJECTION EVERY 12 HOURS SCHEDULED
Status: DISCONTINUED | OUTPATIENT
Start: 2022-01-14 | End: 2022-01-15 | Stop reason: HOSPADM

## 2022-01-14 RX ADMIN — LABETALOL HYDROCHLORIDE 200 MG: 200 TABLET, FILM COATED ORAL at 20:08

## 2022-01-14 RX ADMIN — Medication: at 02:17

## 2022-01-14 RX ADMIN — IBUPROFEN 800 MG: 800 TABLET, FILM COATED ORAL at 01:24

## 2022-01-14 RX ADMIN — SODIUM CHLORIDE, POTASSIUM CHLORIDE, SODIUM LACTATE AND CALCIUM CHLORIDE: 600; 310; 30; 20 INJECTION, SOLUTION INTRAVENOUS at 06:12

## 2022-01-14 RX ADMIN — ACETAMINOPHEN 650 MG: 325 TABLET ORAL at 02:14

## 2022-01-14 RX ADMIN — Medication 30 UNITS: at 00:12

## 2022-01-14 RX ADMIN — LABETALOL HYDROCHLORIDE 200 MG: 200 TABLET, FILM COATED ORAL at 08:58

## 2022-01-14 RX ADMIN — SODIUM CHLORIDE, POTASSIUM CHLORIDE, SODIUM LACTATE AND CALCIUM CHLORIDE: 600; 310; 30; 20 INJECTION, SOLUTION INTRAVENOUS at 04:56

## 2022-01-14 RX ADMIN — LIDOCAINE HYDROCHLORIDE 30 ML: 10 INJECTION, SOLUTION EPIDURAL; INFILTRATION; INTRACAUDAL; PERINEURAL at 00:00

## 2022-01-14 RX ADMIN — HYDRALAZINE HYDROCHLORIDE 10 MG: 20 INJECTION INTRAMUSCULAR; INTRAVENOUS at 00:43

## 2022-01-14 RX ADMIN — WITCH HAZEL 40 EACH: 500 SOLUTION RECTAL; TOPICAL at 02:18

## 2022-01-14 RX ADMIN — IBUPROFEN 800 MG: 800 TABLET, FILM COATED ORAL at 15:54

## 2022-01-14 RX ADMIN — DOCUSATE SODIUM 100 MG: 100 CAPSULE ORAL at 08:59

## 2022-01-14 RX ADMIN — DOCUSATE SODIUM 100 MG: 100 CAPSULE ORAL at 20:07

## 2022-01-14 ASSESSMENT — PAIN SCALES - GENERAL
PAINLEVEL_OUTOF10: 2
PAINLEVEL_OUTOF10: 2
PAINLEVEL_OUTOF10: 0
PAINLEVEL_OUTOF10: 2
PAINLEVEL_OUTOF10: 2

## 2022-01-14 ASSESSMENT — PAIN - FUNCTIONAL ASSESSMENT
PAIN_FUNCTIONAL_ASSESSMENT: ACTIVITIES ARE NOT PREVENTED
PAIN_FUNCTIONAL_ASSESSMENT: ACTIVITIES ARE NOT PREVENTED

## 2022-01-14 ASSESSMENT — PAIN DESCRIPTION - PAIN TYPE
TYPE: ACUTE PAIN
TYPE: ACUTE PAIN

## 2022-01-14 ASSESSMENT — PAIN DESCRIPTION - LOCATION
LOCATION: ABDOMEN
LOCATION: ABDOMEN

## 2022-01-14 ASSESSMENT — PAIN DESCRIPTION - ONSET
ONSET: GRADUAL
ONSET: GRADUAL

## 2022-01-14 ASSESSMENT — PAIN DESCRIPTION - FREQUENCY
FREQUENCY: INTERMITTENT
FREQUENCY: INTERMITTENT

## 2022-01-14 ASSESSMENT — PAIN DESCRIPTION - PROGRESSION
CLINICAL_PROGRESSION: GRADUALLY WORSENING
CLINICAL_PROGRESSION: NOT CHANGED

## 2022-01-14 ASSESSMENT — PAIN DESCRIPTION - DESCRIPTORS
DESCRIPTORS: CRAMPING
DESCRIPTORS: CRAMPING

## 2022-01-14 NOTE — FLOWSHEET NOTE
Report received from AALIYAH Presley from triage and assuming care of the patient now. Saline lock started per triage nurse and labs obtained and sent to lab. Covid test and UDS also obtained and sent to lab. Patient refussing to go back on the FM yet and informed it is time to get FHR now.

## 2022-01-14 NOTE — PLAN OF CARE
Problem: Pain:  Goal: Pain level will decrease  Description: Pain level will decrease  Outcome: Met This Shift  Goal: Control of acute pain  Description: Control of acute pain  Outcome: Met This Shift  Goal: Control of chronic pain  Description: Control of chronic pain  Outcome: Met This Shift     Problem: VAGINAL DELIVERY - RECOVERY AND POST PARTUM  Goal: Vital signs are medically acceptable  Outcome: Met This Shift  Goal: Patient will remain free of falls  Outcome: Met This Shift  Goal: Fundus firm at midline  Outcome: Met This Shift  Goal: Moderate rubra without clots, no purulent discharge, no foul smelling lochia  Outcome: Met This Shift  Goal: Empties bladder  Outcome: Met This Shift  Goal: Verbalizes understanding of normal bowel function resumption  Outcome: Met This Shift  Goal: Edema will be absent or minimal  Outcome: Met This Shift  Goal: Breasts are soft with nipple integrity intact  Outcome: Met This Shift  Goal: Demonstrates appropriate breast feeding techniques  Outcome: Met This Shift  Goal: Appropriate behavior observed  Outcome: Met This Shift  Goal: Positive Mother-Baby interactions are observed  Outcome: Met This Shift  Goal: Perineum intact without discharge or hematoma  Outcome: Met This Shift  Goal: Ambulates independently  Outcome: Met This Shift     Problem: PAIN  Goal: Patient's pain/discomfort is manageable  Outcome: Met This Shift

## 2022-01-14 NOTE — L&D DELIVERY NOTE
Mother's Information    Mother Delivery Information    Surgical or Additional Est. Blood Loss (mL): 0 (View Only): Edit in Flowsheets   Combined Est. Blood Loss (mL): 0        Glenn Santiago [[id number]]    Labor Events    Cervical ripening date/time:     Rupture date/time:             [place] Information    Head delivery date/time: 2022 23:53:00   Changing the 's delivery date/time could affect patient care.:    Delivery date/time:  22 2816     Details:        Delivery Providers    Delivering clinician:      [place] Measurements       Delivery Information    Surgical or additional est. blood loss (mL): 0 (View Only): Edit in Flowsheets   Combined est. blood loss (mL): 0        Spontaneous vaginal delivery over second degree laceration repaired under local anesthesia with 3-0 vicryl in the usual fashion. Mom and baby doing well at this time. Will watch for blood pressure elevations in the postpartum period and note that all the [facility] labs are normal and the patient is now much more relaxed.

## 2022-01-14 NOTE — FLOWSHEET NOTE
Spontaneous vaginal delivery of viable male infant by Dr. Renata Witt. Lusty crying noted with Apgars 9-9. Immediate skin to skin desired and done. See delivery record and infant record for further birth information.

## 2022-01-14 NOTE — FLOWSHEET NOTE
Patient refuses for nurse to press ultrasound onto belly to be able to hear FHR appropriately. Spot checked 120 then patient tells nurse to stop pressing. Dr. Casey Barajas at bedside and aware of BP's and patient almost ready for delivery and pushing now. Dr. Lisa Parrish assess BP's after delivery.

## 2022-01-14 NOTE — H&P
Obstetrics and Gynecology   Obstetrics History and Physical        CHIEF COMPLAINT:  contractions    HISTORY OF PRESENT ILLNESS:      The patient is a 39 y.o. female at 41w4d. OB History        2    Para   1    Term   1            AB        Living   1       SAB        IAB        Ectopic        Molar        Multiple   0    Live Births   1            Patient presents with a chief complaint as above and is being admitted for active phase labor and gestational hypertension. The patient does express a strong desire for natural childbirth and she is consented for this choice at each office visit. Estimated Due Date: Estimated Date of Delivery: 22    PRENATAL CARE:    Complicated by: gestational hypertension  Patient Active Problem List:     Normal labor     Hypertension during pregnancy, antepartum        PAST OB HISTORY:  OB History        2    Para   1    Term   1            AB        Living   1       SAB        IAB        Ectopic        Molar        Multiple   0    Live Births   1                Past Medical History:        Diagnosis Date    Abnormal Pap smear of cervix     around age 17-repeat paps normal    Asthma     USES Daily INHALER-has not used rescue inhaler during pregnancy    Hypertension     after delivery of 1st baby-was medicated for several weeks PP    Infertility, female     with 1st pregnancy-had IUI     Past Surgical History:        Procedure Laterality Date    WISDOM TOOTH EXTRACTION       Allergies:  Patient has no known allergies.     Social History:    Social History     Socioeconomic History    Marital status:      Spouse name: Not on file    Number of children: Not on file    Years of education: Not on file    Highest education level: Not on file   Occupational History    Not on file   Tobacco Use    Smoking status: Never Smoker    Smokeless tobacco: Never Used   Vaping Use    Vaping Use: Never used   Substance and Sexual Activity    Other Paternal Grandmother         dementia    Diabetes Paternal Grandfather     Vision Loss Paternal Grandfather         related to diabetes     Medications Prior to Admission:  Medications Prior to Admission: budesonide (PULMICORT FLEXHALER) 180 MCG/ACT AEPB inhaler, Inhale 2 puffs into the lungs daily  Prenatal MV-Min-Fe Fum-FA-DHA (PRENATAL 1 PO), Take by mouth  [DISCONTINUED] NIFEdipine (NIFEDICAL XL) 60 MG extended release tablet, Take 1 tablet by mouth daily  methylPREDNISolone (MEDROL, BENITO,) 4 MG tablet, By mouth. [DISCONTINUED] brompheniramine-pseudoephedrine-DM (BROMFED DM) 30-2-10 MG/5ML syrup, Take 5 mLs by mouth 4 times daily as needed for Congestion or Cough    REVIEW OF SYSTEMS:  Denies any of the following:  fever/chills, headache, visual changes, chest pain, shortness of breath, nausea/vomiting/diarrhea, bruising and rash    PHYSICAL EXAM:  Vitals:    01/13/22 2138 01/13/22 2211 01/13/22 2237 01/13/22 2238   BP: (!) 162/102 (!) 178/102 (!) 216/117 (!) 180/111   Pulse: 104 98 104 101   Resp:       Temp:       TempSrc:       Weight:       Height:         General appearance:  awake, alert, cooperative, no apparent distress, and appears stated age  Neurologic:  Awake, alert, oriented to name, place and time. Lungs:  No increased work of breathing, good air exchange  Abdomen:  Soft, non tender, gravid, consistent with her gestational age,   EFW:  by external exam was  appropriate for gestational age  Fetal heart rate:  Reassuring. FETAL SURVEILLANCE TESTING SUMMARY  INDICATIONS:  Early labor evaluation.   OBJECTIVE RESULTS:  Fetal heart variability: moderate  Fetal Heart Rate decelerations: none  Fetal Heart Rate accelerations: yes  Baseline FHR: 120 per minute  Uterine contractions: regular, every 2-3 minutes  Fetal surveillance: reassuring, Category 1  Pelvis:  Adequate pelvis  Fetal position: Cephalic    CERVIX: At admission in triage:    Dilation:  7 cm  Effacement:   100%  Station:  -1 cm  Consistency:  soft  Position:  anterior    Dilation (cm): 5   Effacement: 80   Cervical Characteristics: Posterior   Station: -2   Presentation: Vertex    Membranes:  Intact    Labs:   CBC:   Lab Results   Component Value Date    WBC 11.4 01/13/2022    RBC 4.06 01/13/2022    HGB 12.2 01/13/2022    HCT 36.2 01/13/2022    MCV 89.0 01/13/2022    MCH 30.1 01/13/2022    MCHC 33.8 01/13/2022    RDW 14.4 01/13/2022     01/13/2022    MPV 7.9 01/13/2022       ASSESSMENT AND PLAN:    Labor: Admit, anticipate normal delivery, routine labor orders  Fetus: Reassuring  GBS: No  Other: Supportive care for this patient at term with desire for natural childbirth and with mom and baby doing well at this time.

## 2022-01-14 NOTE — FLOWSHEET NOTE
Dr. Gabino Marshall at bedside and monitoring blood pressure. Orders received for Hydralazine 10 mg IV now.

## 2022-01-14 NOTE — DISCHARGE SUMMARY
Obstetrical Discharge Form    Gestational Age: 41w4d    Antepartum complications: gestational hypertension at term and worsened with delivery     Date of Delivery:    Information for the patient's :  Dara Dill [[id number]]   @[facility]@      Information for the patient's :  Dara Dill [[id number]]   @[facility]       Type of Delivery: vaginal, spontaneous    Delivered By: Candance Brooking, MD    Baby:      Information for the patient's :  Daniela Dey [[id number]]   APGAR One: 5     Information for the patient's :  Dara Dill [[id number]]   APGAR Five: 9     Information for the patient's :  Dara Dill [[id number]]   Birth Weight: 8 lb 1.3 oz (3.665 kg)       Anesthesia: None    Intrapartum complications: Gestational hypertension    Postpartum complications: gestational hypertension    Discharge Medication:      Medication List      START taking these medications    labetalol 200 MG tablet  Commonly known as: NORMODYNE  Take 1 tablet by mouth every 12 hours        CONTINUE taking these medications    PRENATAL 1 PO     Pulmicort Flexhaler 180 MCG/ACT Aepb inhaler  Generic drug: budesonide        STOP taking these medications    methylPREDNISolone 4 MG tablet  Commonly known as: MEDROL (BENITO)           Where to Get Your Medications      These medications were sent to [facility] Lemus [address] [place] [id number]    Phone: [phone]   · labetalol 200 MG tablet          Discharge Condition:  good    Discharge Date: 1/15/2022    PLAN:  Follow up in 6 weeks for routine PP visit  All questions answered  D/C summary is written for D/C planning purposes, any delay in discharge from ordered D/C date due may be due to  factors and may be dependant on pediatric orders for  discharge planning.     Candance Brooking, MD

## 2022-01-14 NOTE — FLOWSHEET NOTE
Patient transferred to postpartum room 4403 by self and settled into postpartum room. Pt oriented to folder and postpartum care. Oriented to call light, phone and ordering meals. Postpartum RN's name and phone number posted for pt. Siderails up x2. Pt oriented to equipment.

## 2022-01-15 VITALS
BODY MASS INDEX: 38.99 KG/M2 | DIASTOLIC BLOOD PRESSURE: 92 MMHG | SYSTOLIC BLOOD PRESSURE: 152 MMHG | HEART RATE: 94 BPM | TEMPERATURE: 97.4 F | RESPIRATION RATE: 18 BRPM | WEIGHT: 234 LBS | HEIGHT: 65 IN

## 2022-01-15 PROCEDURE — 6370000000 HC RX 637 (ALT 250 FOR IP): Performed by: OBSTETRICS & GYNECOLOGY

## 2022-01-15 RX ADMIN — LABETALOL HYDROCHLORIDE 200 MG: 200 TABLET, FILM COATED ORAL at 08:42

## 2022-01-15 RX ADMIN — DOCUSATE SODIUM 100 MG: 100 CAPSULE ORAL at 08:41

## 2022-01-15 RX ADMIN — IBUPROFEN 800 MG: 800 TABLET, FILM COATED ORAL at 08:41

## 2022-01-15 ASSESSMENT — PAIN SCALES - GENERAL: PAINLEVEL_OUTOF10: 0

## 2022-01-15 NOTE — FLOWSHEET NOTE

## 2022-01-15 NOTE — PLAN OF CARE
Problem: Pain:  Description: Pain management should include both nonpharmacologic and pharmacologic interventions.   Goal: Pain level will decrease  1/15/2022 1109 by Karen Chong RN  Outcome: Completed  1/14/2022 2221 by Fabrizio Trinidad RN  Outcome: Ongoing  Goal: Control of acute pain  1/15/2022 1109 by Karen Chong RN  Outcome: Completed  1/14/2022 2221 by Fabrizio Trinidad RN  Outcome: Ongoing  Goal: Control of chronic pain  1/15/2022 1109 by Karen Chong RN  Outcome: Completed  1/14/2022 2221 by Fabrizio Trinidad RN  Outcome: Ongoing     Problem: VAGINAL DELIVERY - RECOVERY AND POST PARTUM  Goal: Vital signs are medically acceptable  1/15/2022 1109 by Karen Chong RN  Outcome: Completed  1/14/2022 2221 by Fabrizio Trinidad RN  Outcome: Ongoing  Goal: Patient will remain free of falls  1/15/2022 1109 by Karen Chong RN  Outcome: Completed  1/14/2022 2221 by Fabrizio Trinidad RN  Outcome: Ongoing  Goal: Fundus firm at midline  1/15/2022 1109 by Karen Chong RN  Outcome: Completed  1/14/2022 2221 by Fabrizio Trinidad RN  Outcome: Ongoing  Goal: Moderate rubra without clots, no purulent discharge, no foul smelling lochia  1/15/2022 1109 by Karen Chong RN  Outcome: Completed  1/14/2022 2221 by Fabrizio Trinidad RN  Outcome: Ongoing  Goal: Empties bladder  1/15/2022 1109 by Karen Chong RN  Outcome: Completed  1/14/2022 2221 by Fabrizio Trinidad RN  Outcome: Ongoing  Goal: Verbalizes understanding of normal bowel function resumption  1/15/2022 1109 by Karen Chong RN  Outcome: Completed  1/14/2022 2221 by Fabrizio Trinidad RN  Outcome: Ongoing  Goal: Edema will be absent or minimal  1/15/2022 1109 by Karen Chong RN  Outcome: Completed  1/14/2022 2221 by Fabrizio Trinidad RN  Outcome: Ongoing  Goal: Breasts are soft with nipple integrity intact  1/15/2022 1109 by Karen Chong RN  Outcome: Completed  1/14/2022 2221 by Fabrizio Trinidad RN  Outcome: Ongoing  Goal: Demonstrates appropriate breast feeding techniques  1/15/2022 1109 by Karen Chong, RN  Outcome: Completed  1/14/2022 2221 by Garcia Sanders RN  Outcome: Ongoing  Goal: Appropriate behavior observed  1/15/2022 1109 by Neil Guzman RN  Outcome: Completed  1/14/2022 2221 by Garcia Sanders RN  Outcome: Ongoing  Goal: Positive Mother-Baby interactions are observed  1/15/2022 1109 by Neil Guzman RN  Outcome: Completed  1/14/2022 2221 by Garcia Sanders RN  Outcome: Ongoing  Goal: Perineum intact without discharge or hematoma  1/15/2022 1109 by Neil Guzman RN  Outcome: Completed  1/14/2022 2221 by Garcia Sanders RN  Outcome: Ongoing  Goal: Ambulates independently  1/15/2022 1109 by Neil Guzman RN  Outcome: Completed  1/14/2022 2221 by Garcia Sanders RN  Outcome: Ongoing     Problem: PAIN  Goal: Patient's pain/discomfort is manageable  1/15/2022 1109 by Neil Guzman RN  Outcome: Completed  1/14/2022 2221 by Garcia Sanders RN  Outcome: Ongoing

## 2022-01-15 NOTE — LACTATION NOTE
This note was copied from a baby's chart. LC to bedside to follow up on feedings per MOB request. MOB stated infant is feeding well but her nipples are a little sore. Discussed getting a deep latch and when to brake suction at the end of the feeding when infant is doing non nutritive sucks. MOB has been using lanolin and braking suction when infant is not getting a deep enough latch. Discussed with MOB how to get a pump with her insurance and what pump would be best to order. MOB will call LC if infant wakes for a feeding before discharge.

## 2022-09-02 NOTE — FLOWSHEET NOTE
Dr. Gustavo Finch notified of BP remaining elevated with po Labetalol. Order received for Hypertension protocol to start. Pt. Also 7cm dilated and Dr. Gustavo Finch informed to come for delivery.  states he will be on way to hospital. 177.8